# Patient Record
Sex: FEMALE | NOT HISPANIC OR LATINO | Employment: UNEMPLOYED | URBAN - METROPOLITAN AREA
[De-identification: names, ages, dates, MRNs, and addresses within clinical notes are randomized per-mention and may not be internally consistent; named-entity substitution may affect disease eponyms.]

---

## 2024-08-25 ENCOUNTER — APPOINTMENT (OUTPATIENT)
Dept: GENERAL RADIOLOGY | Facility: CLINIC | Age: 44
End: 2024-08-25
Attending: EMERGENCY MEDICINE

## 2024-08-25 ENCOUNTER — HOSPITAL ENCOUNTER (EMERGENCY)
Facility: CLINIC | Age: 44
Discharge: HOME | End: 2024-08-25
Attending: EMERGENCY MEDICINE | Admitting: EMERGENCY MEDICINE

## 2024-08-25 VITALS
DIASTOLIC BLOOD PRESSURE: 78 MMHG | SYSTOLIC BLOOD PRESSURE: 138 MMHG | OXYGEN SATURATION: 99 % | HEART RATE: 86 BPM | RESPIRATION RATE: 18 BRPM

## 2024-08-25 DIAGNOSIS — M25.561 ACUTE PAIN OF RIGHT KNEE: ICD-10-CM

## 2024-08-25 PROCEDURE — 73562 X-RAY EXAM OF KNEE 3: CPT | Mod: RT

## 2024-08-25 PROCEDURE — 99283 EMERGENCY DEPT VISIT LOW MDM: CPT

## 2024-08-25 ASSESSMENT — COLUMBIA-SUICIDE SEVERITY RATING SCALE - C-SSRS
2. HAVE YOU ACTUALLY HAD ANY THOUGHTS OF KILLING YOURSELF IN THE PAST MONTH?: NO
6. HAVE YOU EVER DONE ANYTHING, STARTED TO DO ANYTHING, OR PREPARED TO DO ANYTHING TO END YOUR LIFE?: NO
1. IN THE PAST MONTH, HAVE YOU WISHED YOU WERE DEAD OR WISHED YOU COULD GO TO SLEEP AND NOT WAKE UP?: NO

## 2024-08-25 ASSESSMENT — ACTIVITIES OF DAILY LIVING (ADL): ADLS_ACUITY_SCORE: 35

## 2024-08-26 NOTE — DISCHARGE INSTRUCTIONS
It was a pleasure taking care of you today. I hope you feel much better soon.  Your x-rays showed no fracture.  Please follow-up with orthopedics in 5-7 days.  Return immediately for worse pain, redness, swelling, or any other concerns.

## 2024-08-26 NOTE — ED TRIAGE NOTES
Pt arrives ambulatory with spouse for right knee pain that began one week ago. Reports inability to bend knee. Started one week ago and no injury noted. Took ibuprofen and tylenol last night. No meds taken today. History of knee issues and dislocated patella.

## 2024-08-26 NOTE — ED PROVIDER NOTES
Emergency Department Note      History of Present Illness     Chief Complaint   Knee Pain      HPI   Leatha Rhoades is a 43 year old female who presents with right anterior knee pain.  She notes that in the absence of fall, trauma, or change in activity level, she has had slow onset of pain and swelling to the right anterior knee.  She notes that the pain is worse with flexion of the knee.  She denies any fevers, chills, redness, or other concerns.    Independent Historian   None    Review of External Notes   Reviewed outside health system ED visit from 6/14/2024    Past Medical History     Medical History and Problem List   The patient denies any significant past medical history.      Medications   The patient is not currently taking any prescribed medications.        Physical Exam     Patient Vitals for the past 24 hrs:   BP Pulse Resp SpO2   08/25/24 2117 138/78 86 -- 99 %   08/25/24 2106 -- -- 18 --     Physical Exam  Constitutional: Alert, attentive  CV: 2+ DP and PT pulses, brisk distal cap refill  MSK: Normal inspection to the right knee and left knee.  Mild tenderness to the right knee anterior to the patella.  Normal extension function intact.  No asymmetric erythema, warmth, or swelling.  No pain out of portion to exam.  Neurological: 5/5 strength to the DF, PF, EHL and FHL motor functions; sensation intact to the DP, SP, T, S and S distributions  Skin: Skin is warm and dry.      Diagnostics       Imaging   XR Knee Right 3 Views   Final Result   IMPRESSION: No fracture or effusion. Mild degenerative changes in the medial and lateral compartments.          Independent Interpretation   No fracture on x-ray right knee    Medical Decision Making / Diagnosis     MDM   Leatha Rhoades is a 43 year old female who presents for evaluation of right knee pain. This occurred without apparent injury, and is associated with pain to the anterior knee. Quadriceps rupture is not evident; there is no palpable defect  and and extension function is intact. There is no evidence of fracture on x-ray. We will plan for knee immobilizer and orthopedic follow-up in 2-3 days. I discussed the possibility of ligamentous injury, and that the patient may need MRI if symptoms do not improve. I advised ibuprofen, ice, rest and elevation. The patient is ambulatory with the knee immobilizer. I advised strict return precautions for worse pain, swelling, numbness, or any other concerning symptoms, as well as follow-up with orthopedic as per above.       Disposition   The patient was discharged.     Diagnosis     ICD-10-CM    1. Acute pain of right knee  M25.561             Cortez Everett MD  08/26/24 0150